# Patient Record
Sex: MALE | Race: WHITE | URBAN - METROPOLITAN AREA
[De-identification: names, ages, dates, MRNs, and addresses within clinical notes are randomized per-mention and may not be internally consistent; named-entity substitution may affect disease eponyms.]

---

## 2017-04-13 ENCOUNTER — OFFICE VISIT (OUTPATIENT)
Dept: SURGERY | Facility: CLINIC | Age: 37
End: 2017-04-13
Payer: COMMERCIAL

## 2017-04-13 VITALS
HEART RATE: 83 BPM | HEIGHT: 70 IN | DIASTOLIC BLOOD PRESSURE: 64 MMHG | SYSTOLIC BLOOD PRESSURE: 110 MMHG | WEIGHT: 170 LBS | BODY MASS INDEX: 24.34 KG/M2

## 2017-04-13 DIAGNOSIS — R10.31 GROIN PAIN, RIGHT: Primary | ICD-10-CM

## 2017-04-13 PROCEDURE — 99203 OFFICE O/P NEW LOW 30 MIN: CPT | Performed by: SURGERY

## 2017-04-13 NOTE — MR AVS SNAPSHOT
"              After Visit Summary   2017    Ranjan Breaux    MRN: [id number]           Patient Information     Date Of Birth          1980        Visit Information        Provider Department      2017 9:30 AM Freddie Grant MD [facility]      Today's Diagnoses     Unilateral recurrent inguinal hernia without obstruction or gangrene    -  1       Follow-ups after your visit        Who to contact     If you have questions or need follow up information about today's clinic visit or your schedule please contact [facility] directly at [phone].  Normal or non-critical lab and imaging results will be communicated to you by real trendshart, letter or phone within 4 business days after the clinic has received the results. If you do not hear from us within 7 days, please contact the clinic through real trendshart or phone. If you have a critical or abnormal lab result, we will notify you by phone as soon as possible.  Submit refill requests through [employer] or call your pharmacy and they will forward the refill request to us. Please allow 3 business days for your refill to be completed.          Additional Information About Your Visit        MyChart Information     [employer] lets you send messages to your doctor, view your test results, renew your prescriptions, schedule appointments and more. To sign up, go to [email or web address] . Click on \"Log in\" on the left side of the screen, which will take you to the Welcome page. Then click on \"Sign up Now\" on the right side of the page.     You will be asked to enter the access code listed below, as well as some personal information. Please follow the directions to create your username and password.     Your access code is: [email or web address]  Expires: 2017  9:32 AM     Your access code will  in 90 days. If you need help or a new code, please call your [place] clinic or [phone].   " "     Care EveryWhere ID     This is your Care EveryWhere ID. This could be used by other organizations to access your [facility] medical records  [id number]        Your Vitals Were     Pulse Height BMI (Body Mass Index)             83 5' 10\" (1.778 m) 24.39 kg/m2          Blood Pressure from Last 3 Encounters:   04/13/17 110/64   03/12/16 (!) 135/97    Weight from Last 3 Encounters:   04/13/17 170 lb (77.1 kg)              Today, you had the following     No orders found for display       Primary Care Provider    Physician No Ref-Primary       No address on file        Thank you!     Thank you for choosing [facility]  for your care. Our goal is always to provide you with excellent care. Hearing back from our patients is one way we can continue to improve our services. Please take a few minutes to complete the written survey that you may receive in the mail after your visit with us. Thank you!             Your Updated Medication List - Protect others around you: Learn how to safely use, store and throw away your medicines at www.disposemymeds.org.          This list is accurate as of: 4/13/17  9:32 AM.  Always use your most recent med list.                   Brand Name Dispense Instructions for use    HYDROcodone-acetaminophen 5-325 MG per tablet    NORCO    15 tablet    Take 1-2 tablets by mouth every 4 hours as needed for moderate to severe pain         "

## 2017-04-13 NOTE — PROGRESS NOTES
Ranjan Breaux Comes to see me today per recommendation of Dr. Milli DEXTER.  This 36-year-old very active healthy patient had undergone a  Lumbar disc surgery by Dr. Mishra in January 2017 with a good result.  He noted several weeks ago pain in his right groin area.  There was no specific event causing this that we does do rigorous yoga.  This is sore when he does use activities.  Since this may have been a hernia he was referred to see me.  He reports several years ago he had a similar episode that resolved with no intervention.  The present pain as an achpain with no sharp radiation.  He has not noticed a bulge.    PMH:  No allergies.             No chronic medications.             Surgical;  L5-S1 discectomy  January 2017             No underlying medical problems.    Smokes five cigarettes daily.  He knows he needs to quit and is trying to do so.  Counseling was given.    12 point review of system otherwise negative.  Good result with his back surgery.      Exam: Alert and appropriate. Normal affect.  Glasses.             Blood pressure 110/64.  Pulse 83.  BMI= 24.4 kg/m2  Weight= 77.1 kg             Chest= clear  Cardiovascular= regular rate              Abdomen= thin, soft, well-developed.              No umbilical hernia.              Bilateral descended testicles with no masses.               Tenderness with palpation of the right external ring more so than the                   left.  No bulging or evidence of hernia with Valsalva bilaterally.               Left side also has some slight tenderness but to lesser degree with                Palpation.      Impression:  No clinical evidence of inguinal hernia.  The exam is possible with the patient's thin body habitus.  He may have an inguinal strain associated with his yoga.  I would recommend conservative nonoperative treatment this time with rest as much as possible and anti-inflammatories to give this a chance to resolve on its own.  He is aware that sometimes  these injuries may go on to a true hernia that would require surgical treatment.  He'll return to see us in several weeks he notices no improvement.  I recommended that he see my associate Dr. Marco Levin if he does not improve.                         Cigarette smoking:  He knows that he should quit.       Freddie Grant MD       Please route or send letter to:  Dr Milli DEXTER

## 2017-04-13 NOTE — LETTER
2017    [facility]  [facility]  [birth year]    Ranjan ABRAMS Namita comes to see me today per recommendation of Dr. Milli DEXTER. This 36-year-old very active healthy patient had undergone a Lumbar disc surgery by Dr. Mishra in 2017 with a good result. He noted several weeks ago pain in his right groin area. There was no specific event causing this that we does do rigorous yoga. This is sore when he does use activities. Since this may have been a hernia he was referred to see me. He reports several years ago he had a similar episode that resolved with no intervention. The present pain as an achpain with no sharp radiation. He has not noticed a bulge.     PMH: No allergies.  No chronic medications.  Surgical; L5-S1 discectomy 2017  No underlying medical problems.     Smokes five cigarettes daily. He knows he needs to quit and is trying to do so. Counseling was given.     12 point review of system otherwise negative. Good result with his back surgery.      Exam: Alert and appropriate. Normal affect. Glasses.  Blood pressure 110/64. Pulse 83. BMI= 24.4 kg/m2 Weight= 77.1 kg  Chest= clear Cardiovascular= regular rate  Abdomen= thin, soft, well-developed.  No umbilical hernia.  Bilateral descended testicles with no masses.  Tenderness with palpation of the right external ring more so than the left. No bulging or evidence of hernia with Valsalva bilaterally.  Left side also has some slight tenderness but to lesser degree with Palpation.      Impression: No clinical evidence of inguinal hernia. The exam is possible with the patient's thin body habitus. He may have an inguinal strain associated with his yoga. I would recommend conservative nonoperative treatment this time with rest as much as possible and anti-inflammatories to give this a chance to resolve on its own. He is aware that sometimes these injuries may go on to a true hernia that would require surgical treatment. He'll return to see us in several  weeks he notices no improvement. I recommended that he see my associate Dr. Marco Levin if he does not improve.     Cigarette smoking: He knows that he should quit.     Freddie Grant MD

## 2017-06-08 ENCOUNTER — OFFICE VISIT (OUTPATIENT)
Dept: SURGERY | Facility: CLINIC | Age: 37
End: 2017-06-08
Payer: COMMERCIAL

## 2017-06-08 DIAGNOSIS — R10.31 GROIN PAIN, RIGHT: Primary | ICD-10-CM

## 2017-06-08 PROCEDURE — 99212 OFFICE O/P EST SF 10 MIN: CPT | Performed by: SURGERY

## 2017-06-08 NOTE — PROGRESS NOTES
Ranjan ABRAMS Namita Return to see me in the office today.  I saw him in April of this year for right groin pain.  At that time he had no definitive hernia.  He tried anti-inflammatories and decreasing his activities.  However, the pain is persistent.  This bothers him whenever he moves and sits and also standing at work.  He has no discomfort on the left side.      PMH:  Unchanged from 4/13/17 exam             Still smokes several cigarettes daily ( trying to quit)              No chronic medications.      Exam: Alert and appropriate.  Thin  Normal affect.              No obvious inguinal bulge on the right.              Nontender left inguinal area with no hernia              Very tender with external palpation the right angle area and also via               Scrotal exam to the external ring.   No definitive hernia bulge.    Impression:  Ongoing right inguinal pain with no definitive hernia sac bulge.                       As we had discussed earlier this may be a sportsman's hernia                       With a tear in the inguinal floor causing his discomfort.                        He has not resolved with conservative treatment.                        Dr. Marco Levin who is an expert in this field is in the office                          And he will examine Ranjan today.     Freddie Grant MD     See Dr George assessment.

## 2017-06-08 NOTE — MR AVS SNAPSHOT
After Visit Summary   6/8/2017    Ranjan Breaux    MRN: [id number]           Patient Information     Date Of Birth          1980        Visit Information        Provider Department      6/8/2017 9:15 AM Freddie Grant MD [facility]      Today's Diagnoses     Groin pain, right    -  1       Follow-ups after your visit        Your next 10 appointments already scheduled     Jun 09, 2017  7:00 AM CDT   MR PELVIS W/O & W CONTRAST with SHMRP1   [facility]    [address] [place] [id number]   [phone]           Take your medicines as usual, unless your doctor tells you not to. Bring a list of your current medicines to your exam (including vitamins, minerals and over-the-counter drugs).  You will be given intravenous contrast for this exam. To prepare:   The day before your exam, drink extra fluids at least six 8-ounce glasses (unless your doctor tells you to restrict your fluids).   Have a blood test (creatinine test) within 30 days of your exam. Go to your clinic or Diagnostic Imaging Department for this test.  The MRI machine uses a strong magnet. Please wear clothes without metal (snaps, zippers). A sweatsuit works well, or we may give you a hospital gown.  Please remove any body piercings and hair extensions before you arrive. You will also remove watches, jewelry, hairpins, wallets, dentures, partial dental plates and hearing aids. You may wear contact lenses, and you may be able to wear your rings. We have a safe place to keep your personal items, but it is safer to leave them at home.   **IMPORTANT** THE INSTRUCTIONS BELOW ARE ONLY FOR THOSE PATIENTS WHO HAVE BEEN TOLD THEY WILL RECEIVE SEDATION OR GENERAL ANESTHESIA DURING THEIR MRI PROCEDURE:  IF YOU WILL RECEIVE SEDATION (take medicine to help you relax during your exam):   You must get the medicine from your doctor  before you arrive. Bring the medicine to the exam. Do not take it at home.   Arrive one hour early. Bring someone who can take you home after the test. Your medicine will make you sleepy. After the exam, you may not drive, take a bus or take a taxi by yourself.   No eating 8 hours before your exam. You may have clear liquids up until 4 hours before your exam. (Clear liquids include water, clear tea, black coffee and fruit juice without pulp.)  IF YOU WILL RECEIVE ANESTHESIA (be asleep for your exam):   Arrive 1 1/2 hours early. Bring someone who can take you home after the test. You may not drive, take a bus or take a taxi by yourself.   No eating 8 hours before your exam. You may have clear liquids up until 4 hours before your exam. (Clear liquids include water, clear tea, black coffee and fruit juice without pulp.)  Please call the Imaging Department at your exam site with any questions.              Future tests that were ordered for you today     Open Future Orders        Priority Expected Expires Ordered    MR Pelvis w/o & w Contrast Routine 6/8/2017 6/8/2018 6/8/2017            Who to contact     If you have questions or need follow up information about today's clinic visit or your schedule please contact [facility] directly at [phone].  Normal or non-critical lab and imaging results will be communicated to you by Chaordixhart, letter or phone within 4 business days after the clinic has received the results. If you do not hear from us within 7 days, please contact the clinic through Chaordixhart or phone. If you have a critical or abnormal lab result, we will notify you by phone as soon as possible.  Submit refill requests through TalkTo or call your pharmacy and they will forward the refill request to us. Please allow 3 business days for your refill to be completed.          Additional Information About Your Visit        TalkTo Information     TalkTo lets you send messages to your doctor, view  "your test results, renew your prescriptions, schedule appointments and more. To sign up, go to [email or web address] . Click on \"Log in\" on the left side of the screen, which will take you to the Welcome page. Then click on \"Sign up Now\" on the right side of the page.     You will be asked to enter the access code listed below, as well as some personal information. Please follow the directions to create your username and password.     Your access code is: [email or web address]  Expires: 2017  9:32 AM     Your access code will  in 90 days. If you need help or a new code, please call your [place] clinic or [phone].        Care EveryWhere ID     This is your Care EveryWhere ID. This could be used by other organizations to access your [place] medical records  [id number]         Blood Pressure from Last 3 Encounters:   17 110/64   16 (!) 135/97    Weight from Last 3 Encounters:   17 170 lb (77.1 kg)               Primary Care Provider    Physician No Ref-Primary       No address on file        Thank you!     Thank you for choosing [facility]  for your care. Our goal is always to provide you with excellent care. Hearing back from our patients is one way we can continue to improve our services. Please take a few minutes to complete the written survey that you may receive in the mail after your visit with us. Thank you!             Your Updated Medication List - Protect others around you: Learn how to safely use, store and throw away your medicines at www.disposemymeds.org.          This list is accurate as of: 17  9:57 AM.  Always use your most recent med list.                   Brand Name Dispense Instructions for use    HYDROcodone-acetaminophen 5-325 MG per tablet    NORCO    15 tablet    Take 1-2 tablets by mouth every 4 hours as needed for moderate to severe pain         "

## 2017-06-08 NOTE — LETTER
2017    RE:  Ranjan MARSHALL Hed-:  [birth year]    Ranjan Breaux returns to see me in the office today.  I saw him in April of this year for right groin pain.  At that time he had no definitive hernia.  He tried anti-inflammatories and decreasing his activities. However, the pain is persistent.  This bothers him whenever he moves and sits and also standing at work.  He has no discomfort on the left side.     PMH:  Unchanged from 17 exam  Still smokes several cigarettes daily ( trying to quit)  No chronic medications.     Exam: Alert and appropriate.  Thin  Normal affect.  No obvious inguinal bulge on the right.  Nontender left inguinal area with no hernia  Very tender with external palpation the right angle area and also via  Scrotal exam to the external ring.   No definitive hernia bulge.     Impression:  Ongoing right inguinal pain with no definitive hernia sac bulge.  As we had discussed earlier this may be a sportsman's hernia  with a tear in the inguinal floor causing his discomfort.  He has not resolved with conservative treatment.  Dr. Marco Levin who is an expert in this field is in the office  and he will examine Ranjan today.     Freddie Grant MD

## 2017-06-09 ENCOUNTER — HOSPITAL ENCOUNTER (OUTPATIENT)
Dept: MRI IMAGING | Facility: CLINIC | Age: 37
Discharge: HOME | End: 2017-06-09
Attending: SURGERY | Admitting: SURGERY
Payer: COMMERCIAL

## 2017-06-09 DIAGNOSIS — R10.31 GROIN PAIN, RIGHT: ICD-10-CM

## 2017-06-09 PROCEDURE — A9585 GADOBUTROL INJECTION: HCPCS | Performed by: SURGERY

## 2017-06-09 PROCEDURE — 72197 MRI PELVIS W/O & W/DYE: CPT

## 2017-06-09 PROCEDURE — 25000128 H RX IP 250 OP 636: Performed by: SURGERY

## 2017-06-09 RX ORDER — GADOBUTROL 604.72 MG/ML
8 INJECTION INTRAVENOUS ONCE
Status: COMPLETED | OUTPATIENT
Start: 2017-06-09 | End: 2017-06-09

## 2017-06-09 RX ADMIN — GADOBUTROL 8 ML: 604.72 INJECTION INTRAVENOUS at 08:13

## 2017-07-17 ENCOUNTER — OFFICE VISIT (OUTPATIENT)
Dept: SURGERY | Facility: CLINIC | Age: 37
End: 2017-07-17
Payer: COMMERCIAL

## 2017-07-17 VITALS
DIASTOLIC BLOOD PRESSURE: 70 MMHG | HEIGHT: 70 IN | WEIGHT: 160 LBS | BODY MASS INDEX: 22.9 KG/M2 | HEART RATE: 81 BPM | SYSTOLIC BLOOD PRESSURE: 106 MMHG

## 2017-07-17 DIAGNOSIS — R10.31 RIGHT GROIN PAIN: Primary | ICD-10-CM

## 2017-07-17 PROCEDURE — 99213 OFFICE O/P EST LOW 20 MIN: CPT | Mod: 57 | Performed by: PHYSICIAN ASSISTANT

## 2017-07-17 PROCEDURE — 99213 OFFICE O/P EST LOW 20 MIN: CPT | Performed by: SURGERY

## 2017-07-17 RX ORDER — FLUTICASONE PROPIONATE 50 MCG
1 SPRAY, SUSPENSION (ML) NASAL DAILY
COMMUNITY

## 2017-07-17 NOTE — INTERVAL H&P NOTE
This note is for the purpose of making the H&P performed clinic within the last 30 days available in the hospital encounter.

## 2017-07-17 NOTE — MR AVS SNAPSHOT
"              After Visit Summary   7/17/2017    Ranjan Breaux    MRN: [id number]           Patient Information     Date Of Birth          1980        Visit Information        Provider Department      7/17/2017 12:15 PM Marco Levin MD [facility]       Follow-ups after your visit        Your next 10 appointments already scheduled     Jul 18, 2017  7:30 AM CDT   [facility] Same Day Surgery with Marco Levin MD, Nory Fuchs PA-C   [facility])    [facility])   [phone]            Jul 18, 2017   Procedure with Marco Levin MD   [facility] (--)    [address]  [facility] [address] [phone]              Who to contact     If you have questions or need follow up information about today's clinic visit or your schedule please contact [facility] directly at [phone].  Normal or non-critical lab and imaging results will be communicated to you by vSocialhart, letter or phone within 4 business days after the clinic has received the results. If you do not hear from us within 7 days, please contact the clinic through imeemt or phone. If you have a critical or abnormal lab result, we will notify you by phone as soon as possible.  Submit refill requests through Ikonisys or call your pharmacy and they will forward the refill request to us. Please allow 3 business days for your refill to be completed.          Additional Information About Your Visit        vSocialhart Information     Ikonis[employer] lets you send messages to your doctor, view your test results, renew your prescriptions, schedule appointments and more. To sign up, go to [email or web address] . Click on \"Log in\" on the left side of the screen, which will take you to the Welcome page. Then " "click on \"Sign up Now\" on the right side of the page.     You will be asked to enter the access code listed below, as well as some personal information. Please follow the directions to create your username and password.     Your access code is: [email or web address]  Expires: 10/15/2017 12:29 PM     Your access code will  in 90 days. If you need help or a new code, please call your [facility] clinic or [phone].        Care EveryWhere ID     This is your Care EveryWhere ID. This could be used by other organizations to access your [facility] medical records  [id number]        Your Vitals Were     Pulse Height BMI (Body Mass Index)             81 5' 10\" (1.778 m) 22.96 kg/m2          Blood Pressure from Last 3 Encounters:   17 106/70   17 110/64   16 (!) 135/97    Weight from Last 3 Encounters:   17 160 lb (72.6 kg)   17 170 lb (77.1 kg)              Today, you had the following     No orders found for display       Primary Care Provider    Physician No Ref-Primary       No address on file        Equal Access to Services     [facility]: Hadii kristie Oquendo, watrevada barrett, qaybta kaalmada adeangie, brian galvez . So [facility] [phone].    ATENCIÓN: Si habla español, tiene a gonzalez disposición servicios gratuitos de asistencia lingüística. Llame al [phone].    We comply with applicable federal civil rights laws and [place] laws. We do not discriminate on the basis of race, color, national origin, age, disability sex, sexual orientation or gender identity.            Thank you!     Thank you for choosing [facility]  for your care. Our goal is always to provide you with excellent care. Hearing back from our patients is one way we can continue to improve our services. Please take a few minutes to complete the written survey that you may receive in the mail after your visit with us. Thank you!             Your Updated Medication List - " Protect others around you: Learn how to safely use, store and throw away your medicines at www.disposemymeds.org.          This list is accurate as of: 7/17/17 12:31 PM.  Always use your most recent med list.                   Brand Name Dispense Instructions for use Diagnosis    fluticasone 50 MCG/ACT spray    FLONASE     Spray 1 spray into both nostrils daily        HYDROcodone-acetaminophen 5-325 MG per tablet    NORCO    15 tablet    Take 1-2 tablets by mouth every 4 hours as needed for moderate to severe pain

## 2017-07-17 NOTE — PROGRESS NOTES
Patient returns today to discuss his scheduled surgery for tomorrow.  He is a 29 seminal gentleman with chronic athletically induced right inguinal pain consistent with sportsman's hernia.  I saw him during a coordinated visit with Dr. Grant recently.  At the time of that visit I also felt that he was having some adductor pain and is referred for possible tendon lengthening surgery.  Since the time of this most recent visit he states his symptoms are in fact slightly worse.  He continues to have isolated discomfort within his inguinal canal with aggressive and strenuous activity.  Pain is present during coughing or sneezing or big laugh.  This continues to cause debilitation.  He was examined in the office and there continues to be no evidence for traditional hernia.    With a lengthy discussion regarding the management options.  He is scheduled to undergo groin expiration with athletic hernia repair and tenotomy of his adductor.  This procedure was thoroughly discussed.  We reviewed the risks, benefits and anticipated outcomes.  His questions were all answered.  We will proceed with surgery on the following day.  Total time spent was 15 minutes with greater than 50% face-to-face consultation.    Please route or send letter to:  Primary Care Provider (PCP)

## 2017-07-17 NOTE — MR AVS SNAPSHOT
"              After Visit Summary   7/17/2017    Ranjan Breaux    MRN: [id number]           Patient Information     Date Of Birth          1980        Visit Information        Provider Department      7/17/2017 12:30 PM Kleber Zavaleta PA-C [facility]      Today's Diagnoses     Right groin pain    -  1       Follow-ups after your visit        Your next 10 appointments already scheduled     Jul 18, 2017  7:30 AM CDT   [facility] Same Day Surgery with Marco Levin MD, Nory Fuchs PA-C   [facility] ([facility])    [facility] ([facility])   [phone]            Jul 18, 2017   Procedure with Marco Levin MD   [facility] (--)    [address]  [facility] [address] [phone]              Who to contact     If you have questions or need follow up information about today's clinic visit or your schedule please contact [facility] directly at [phone].  Normal or non-critical lab and imaging results will be communicated to you by [employer], letter or phone within 4 business days after the clinic has received the results. If you do not hear from us within 7 days, please contact the clinic through [employer] or phone. If you have a critical or abnormal lab result, we will notify you by phone as soon as possible.  Submit refill requests through [facility] or call your pharmacy and they will forward the refill request to us. Please allow 3 business days for your refill to be completed.          Additional Information About Your Visit        MyChart Information     [facility] lets you send messages to your doctor, view your test results, renew your prescriptions, schedule appointments and more. To sign up, go to [email or web address] . Click on \"Log in\" on the left side of the " "screen, which will take you to the Welcome page. Then click on \"Sign up Now\" on the right side of the page.     You will be asked to enter the access code listed below, as well as some personal information. Please follow the directions to create your username and password.     Your access code is: [email or web address]  Expires: 10/15/2017 12:29 PM     Your access code will  in 90 days. If you need help or a new code, please call your [facility] or [phone].        Care EveryWhere ID     This is your Care EveryWhere ID. This could be used by other organizations to access your [place] medical records  [id number]         Blood Pressure from Last 3 Encounters:   17 106/70   17 110/64   16 (!) 135/97    Weight from Last 3 Encounters:   17 72.6 kg (160 lb)   17 77.1 kg (170 lb)              Today, you had the following     No orders found for display       Primary Care Provider    Physician No Ref-Primary       No address on file        Equal Access to Services     [facility]: Hadii kristie Oquendo, waaxda barrett, qaybta jessealedinson hunter, brian galvez . So [facility] [phone].    ATENCIÓN: Si habla español, tiene a gonzalez disposición servicios gratuitos de asistencia lingüística. Llame al [phone].    We comply with applicable federal civil rights laws and [place] laws. We do not discriminate on the basis of race, color, national origin, age, disability sex, sexual orientation or gender identity.            Thank you!     Thank you for choosing [facility]  for your care. Our goal is always to provide you with excellent care. Hearing back from our patients is one way we can continue to improve our services. Please take a few minutes to complete the written survey that you may receive in the mail after your visit with us. Thank you!             Your Updated Medication List - Protect others around you: Learn how to safely use, " store and throw away your medicines at www.disposemymeds.org.          This list is accurate as of: 7/17/17  1:01 PM.  Always use your most recent med list.                   Brand Name Dispense Instructions for use Diagnosis    fluticasone 50 MCG/ACT spray    FLONASE     Spray 1 spray into both nostrils daily        HYDROcodone-acetaminophen 5-325 MG per tablet    NORCO    15 tablet    Take 1-2 tablets by mouth every 4 hours as needed for moderate to severe pain

## 2017-07-17 NOTE — H&P (VIEW-ONLY)
"[facility] Preoperative History & Physical     Ranjan Breaux MRN# [id number]   YOB: 1980 Age: 36 year old     Date of Surgery: 7/18/17  Surgeon: Marco Levin MD  Primary care provider: No Ref-Primary, Physician    PREOP DIAGNOSIS/REASON FOR SURGERY: Right Sports Hernia    SURGERY/PROCEDURE INDICATED: Right Groin Exploration with Hernia Repair     HISTORY OF PRESENT ILLNESS:   This patient is a 36 year old male who presents with Right groin tenderness for months.  On a previous occasion this improved with rest but returned.  Now pain is worse as the day goes on and with activity.  No bulge noted by patient.  Pain was present prior to his back surgery in January.    From chart check, I noted a 10 lb wt loss from April visit with Dr. Freddie Grant, to today's visit.  However, pt reports he fluctuates from 160-168 and is not concerned by this.    PAST MEDICAL HISTORY:   No past medical history on file.    PAST SURGICAL HISTORY:   Past Surgical History:   Procedure Laterality Date     BACK SURGERY  01/2017    Herniated disc   No anesthesia problems with that surgery.  Received Norco post op for pain    SOCIAL HISTORY:  Social History   Substance Use Topics     Smoking status: Current Every Day Smoker     Packs/day: 0.50     Smokeless tobacco: Not on file      Comment: 5 cigaretts daily-- trying to quit     Alcohol use Yes      Comment: rare        FAMILY HISTORY:   No family history on file.    Family history of anesthesia reaction: No  Family history of bleeding disorders: No    ALLERGIES:  No Known Allergies     MEDICATIONS:  Current Outpatient Prescriptions Ordered in Epic   Medication     fluticasone (FLONASE) 50 MCG/ACT spray daily         No current Epic-ordered facility-administered medications on file.        REVIEW OF SYSTEMS:  Brief ROS is negative other than noted in the HPI.  C: NEGATIVE for fever, chills, change in weight.  \"Cold\" or URI last week but feeling better now.  R: " NEGATIVE for significant cough or SOB.  Cough improved  CV: NEGATIVE for chest pain, palpitations or peripheral edema  GI: NEGATIVE for nausea, vomiting, abdominal pain, heartburn, or change in bowel habits  H: NEGATIVE for bleeding problems     PHYSICAL EXAM:  There were no vitals taken for this visit.  General - This is a well developed, well nourished male in no apparent distress.  HEENT - Normocephalic. Atraumatic. Moist mucous membranes. Pupils equal. EOMi. No scleral icterus.  Neck - Supple without masses.  Lungs - Clear to ascultation bilaterally without crackles or wheezing.    Heart - Regular rate & rhythm without murmur.  Abdomen - Soft, nontender, nondistended with +bowel sounds, no organomegaly.  Extremities - Moves all extremities. Warm without edema.  Neurologic - Nonfocal.    LAB/RADIOLOGY RESULTS:   EKG, CXR, Bloodwork deferred to Anesthesia if needed    IMPRESSION/ACTIVE PROBLEMS:    Right Sports Hernia/Groin Pain  Other pertinent diagnosis: N/A     PLAN:  Patient's active problems diagnostically and therapeutically optimized for planned procedure.     Moris Zavaleta PA-C  [phone]

## 2017-07-17 NOTE — PROGRESS NOTES
"[facility] Preoperative History & Physical     Ranjan Breaux MRN# [id number]   YOB: 1980 Age: 36 year old     Date of Surgery: 7/18/17  Surgeon: Marco Levin MD  Primary care provider: No Ref-Primary, Physician    PREOP DIAGNOSIS/REASON FOR SURGERY: Right Sports Hernia    SURGERY/PROCEDURE INDICATED: Right Groin Exploration with Hernia Repair     HISTORY OF PRESENT ILLNESS:   This patient is a 36 year old male who presents with Right groin tenderness for months.  On a previous occasion this improved with rest but returned.  Now pain is worse as the day goes on and with activity.  No bulge noted by patient.  Pain was present prior to his back surgery in January.    From chart check, I noted a 10 lb wt loss from April visit with Dr. Freddie Grant, to today's visit.  However, pt reports he fluctuates from 160-168 and is not concerned by this.    PAST MEDICAL HISTORY:   No past medical history on file.    PAST SURGICAL HISTORY:   Past Surgical History:   Procedure Laterality Date     BACK SURGERY  01/2017    Herniated disc   No anesthesia problems with that surgery.  Received Norco post op for pain    SOCIAL HISTORY:  Social History   Substance Use Topics     Smoking status: Current Every Day Smoker     Packs/day: 0.50     Smokeless tobacco: Not on file      Comment: 5 cigaretts daily-- trying to quit     Alcohol use Yes      Comment: rare        FAMILY HISTORY:   No family history on file.    Family history of anesthesia reaction: No  Family history of bleeding disorders: No    ALLERGIES:  No Known Allergies     MEDICATIONS:  Current Outpatient Prescriptions Ordered in Epic   Medication     fluticasone (FLONASE) 50 MCG/ACT spray daily         No current Epic-ordered facility-administered medications on file.        REVIEW OF SYSTEMS:  Brief ROS is negative other than noted in the HPI.  C: NEGATIVE for fever, chills, change in weight.  \"Cold\" or URI last week but feeling better now.  R: " NEGATIVE for significant cough or SOB.  Cough improved  CV: NEGATIVE for chest pain, palpitations or peripheral edema  GI: NEGATIVE for nausea, vomiting, abdominal pain, heartburn, or change in bowel habits  H: NEGATIVE for bleeding problems     PHYSICAL EXAM:  There were no vitals taken for this visit.  General - This is a well developed, well nourished male in no apparent distress.  HEENT - Normocephalic. Atraumatic. Moist mucous membranes. Pupils equal. EOMi. No scleral icterus.  Neck - Supple without masses.  Lungs - Clear to ascultation bilaterally without crackles or wheezing.    Heart - Regular rate & rhythm without murmur.  Abdomen - Soft, nontender, nondistended with +bowel sounds, no organomegaly.  Extremities - Moves all extremities. Warm without edema.  Neurologic - Nonfocal.    LAB/RADIOLOGY RESULTS:   EKG, CXR, Bloodwork deferred to Anesthesia if needed    IMPRESSION/ACTIVE PROBLEMS:    Right Sports Hernia/Groin Pain  Other pertinent diagnosis: N/A     PLAN:  Patient's active problems diagnostically and therapeutically optimized for planned procedure.     Moris Zavaleta PA-C  [phone]

## 2017-07-18 ENCOUNTER — HOSPITAL ENCOUNTER (OUTPATIENT)
Facility: CLINIC | Age: 37
Discharge: HOME | End: 2017-07-18
Attending: SURGERY | Admitting: ORTHOPAEDIC SURGERY
Payer: COMMERCIAL

## 2017-07-18 ENCOUNTER — ANESTHESIA (OUTPATIENT)
Dept: SURGERY | Facility: CLINIC | Age: 37
End: 2017-07-18
Payer: COMMERCIAL

## 2017-07-18 ENCOUNTER — ANESTHESIA EVENT (OUTPATIENT)
Dept: SURGERY | Facility: CLINIC | Age: 37
End: 2017-07-18
Payer: COMMERCIAL

## 2017-07-18 ENCOUNTER — APPOINTMENT (OUTPATIENT)
Dept: SURGERY | Facility: PHYSICIAN GROUP | Age: 37
End: 2017-07-18
Payer: COMMERCIAL

## 2017-07-18 VITALS
HEIGHT: 70 IN | TEMPERATURE: 97 F | OXYGEN SATURATION: 99 % | SYSTOLIC BLOOD PRESSURE: 110 MMHG | HEART RATE: 74 BPM | DIASTOLIC BLOOD PRESSURE: 69 MMHG | BODY MASS INDEX: 23.41 KG/M2 | RESPIRATION RATE: 16 BRPM | WEIGHT: 163.5 LBS

## 2017-07-18 DIAGNOSIS — Z98.890 S/P HERNIA REPAIR: ICD-10-CM

## 2017-07-18 DIAGNOSIS — Z87.19 S/P HERNIA REPAIR: ICD-10-CM

## 2017-07-18 DIAGNOSIS — S39.81XA SPORTS HERNIA, INITIAL ENCOUNTER: Primary | ICD-10-CM

## 2017-07-18 PROCEDURE — 25000125 ZZHC RX 250: Performed by: NURSE ANESTHETIST, CERTIFIED REGISTERED

## 2017-07-18 PROCEDURE — 37000008 ZZH ANESTHESIA TECHNICAL FEE, 1ST 30 MIN: Performed by: ORTHOPAEDIC SURGERY

## 2017-07-18 PROCEDURE — 25000128 H RX IP 250 OP 636: Performed by: NURSE ANESTHETIST, CERTIFIED REGISTERED

## 2017-07-18 PROCEDURE — 37000009 ZZH ANESTHESIA TECHNICAL FEE, EACH ADDTL 15 MIN: Performed by: ORTHOPAEDIC SURGERY

## 2017-07-18 PROCEDURE — 36000052 ZZH SURGERY LEVEL 2 EA 15 ADDTL MIN: Performed by: ORTHOPAEDIC SURGERY

## 2017-07-18 PROCEDURE — 27210794 ZZH OR GENERAL SUPPLY STERILE: Performed by: ORTHOPAEDIC SURGERY

## 2017-07-18 PROCEDURE — 49999 UNLISTED PX ABD PERTM&OMN: CPT | Performed by: SURGERY

## 2017-07-18 PROCEDURE — 71000027 ZZH RECOVERY PHASE 2 EACH 15 MINS: Performed by: ORTHOPAEDIC SURGERY

## 2017-07-18 PROCEDURE — 36000050 ZZH SURGERY LEVEL 2 1ST 30 MIN: Performed by: ORTHOPAEDIC SURGERY

## 2017-07-18 PROCEDURE — 40000170 ZZH STATISTIC PRE-PROCEDURE ASSESSMENT II: Performed by: ORTHOPAEDIC SURGERY

## 2017-07-18 PROCEDURE — 49999 UNLISTED PX ABD PERTM&OMN: CPT | Mod: AS | Performed by: PHYSICIAN ASSISTANT

## 2017-07-18 PROCEDURE — 25000132 ZZH RX MED GY IP 250 OP 250 PS 637: Performed by: PHYSICIAN ASSISTANT

## 2017-07-18 PROCEDURE — 25000128 H RX IP 250 OP 636: Performed by: ORTHOPAEDIC SURGERY

## 2017-07-18 PROCEDURE — 71000012 ZZH RECOVERY PHASE 1 LEVEL 1 FIRST HR: Performed by: ORTHOPAEDIC SURGERY

## 2017-07-18 PROCEDURE — 71000013 ZZH RECOVERY PHASE 1 LEVEL 1 EA ADDTL HR: Performed by: ORTHOPAEDIC SURGERY

## 2017-07-18 PROCEDURE — 27210995 ZZH RX 272: Performed by: ORTHOPAEDIC SURGERY

## 2017-07-18 PROCEDURE — C1781 MESH (IMPLANTABLE): HCPCS | Performed by: ORTHOPAEDIC SURGERY

## 2017-07-18 PROCEDURE — 25000125 ZZHC RX 250: Performed by: ORTHOPAEDIC SURGERY

## 2017-07-18 PROCEDURE — 25000128 H RX IP 250 OP 636: Performed by: ANESTHESIOLOGY

## 2017-07-18 PROCEDURE — 25000566 ZZH SEVOFLURANE, EA 15 MIN: Performed by: ORTHOPAEDIC SURGERY

## 2017-07-18 DEVICE — MESH PROGRIP 4.7X3" PARIETEX RIGHT TEM1208GR: Type: IMPLANTABLE DEVICE | Site: ABDOMEN | Status: FUNCTIONAL

## 2017-07-18 RX ORDER — MEPERIDINE HYDROCHLORIDE 25 MG/ML
12.5 INJECTION INTRAMUSCULAR; INTRAVENOUS; SUBCUTANEOUS
Status: DISCONTINUED | OUTPATIENT
Start: 2017-07-18 | End: 2017-07-18 | Stop reason: HOSPADM

## 2017-07-18 RX ORDER — SODIUM CHLORIDE, SODIUM LACTATE, POTASSIUM CHLORIDE, CALCIUM CHLORIDE 600; 310; 30; 20 MG/100ML; MG/100ML; MG/100ML; MG/100ML
INJECTION, SOLUTION INTRAVENOUS CONTINUOUS
Status: DISCONTINUED | OUTPATIENT
Start: 2017-07-18 | End: 2017-07-18 | Stop reason: HOSPADM

## 2017-07-18 RX ORDER — FENTANYL CITRATE 50 UG/ML
INJECTION, SOLUTION INTRAMUSCULAR; INTRAVENOUS PRN
Status: DISCONTINUED | OUTPATIENT
Start: 2017-07-18 | End: 2017-07-18

## 2017-07-18 RX ORDER — CEFAZOLIN SODIUM 1 G/3ML
1 INJECTION, POWDER, FOR SOLUTION INTRAMUSCULAR; INTRAVENOUS SEE ADMIN INSTRUCTIONS
Status: DISCONTINUED | OUTPATIENT
Start: 2017-07-18 | End: 2017-07-18 | Stop reason: HOSPADM

## 2017-07-18 RX ORDER — NALOXONE HYDROCHLORIDE 0.4 MG/ML
.1-.4 INJECTION, SOLUTION INTRAMUSCULAR; INTRAVENOUS; SUBCUTANEOUS
Status: DISCONTINUED | OUTPATIENT
Start: 2017-07-18 | End: 2017-07-18 | Stop reason: HOSPADM

## 2017-07-18 RX ORDER — ONDANSETRON 2 MG/ML
INJECTION INTRAMUSCULAR; INTRAVENOUS PRN
Status: DISCONTINUED | OUTPATIENT
Start: 2017-07-18 | End: 2017-07-18

## 2017-07-18 RX ORDER — ONDANSETRON 2 MG/ML
4 INJECTION INTRAMUSCULAR; INTRAVENOUS EVERY 30 MIN PRN
Status: DISCONTINUED | OUTPATIENT
Start: 2017-07-18 | End: 2017-07-18 | Stop reason: HOSPADM

## 2017-07-18 RX ORDER — FENTANYL CITRATE 0.05 MG/ML
25-50 INJECTION, SOLUTION INTRAMUSCULAR; INTRAVENOUS
Status: DISCONTINUED | OUTPATIENT
Start: 2017-07-18 | End: 2017-07-18 | Stop reason: HOSPADM

## 2017-07-18 RX ORDER — GLYCOPYRROLATE 0.2 MG/ML
INJECTION, SOLUTION INTRAMUSCULAR; INTRAVENOUS PRN
Status: DISCONTINUED | OUTPATIENT
Start: 2017-07-18 | End: 2017-07-18

## 2017-07-18 RX ORDER — LIDOCAINE HYDROCHLORIDE 20 MG/ML
INJECTION, SOLUTION INFILTRATION; PERINEURAL PRN
Status: DISCONTINUED | OUTPATIENT
Start: 2017-07-18 | End: 2017-07-18

## 2017-07-18 RX ORDER — HYDROCODONE BITARTRATE AND ACETAMINOPHEN 5; 325 MG/1; MG/1
1-2 TABLET ORAL
Status: COMPLETED | OUTPATIENT
Start: 2017-07-18 | End: 2017-07-18

## 2017-07-18 RX ORDER — PROPOFOL 10 MG/ML
INJECTION, EMULSION INTRAVENOUS PRN
Status: DISCONTINUED | OUTPATIENT
Start: 2017-07-18 | End: 2017-07-18

## 2017-07-18 RX ORDER — SODIUM CHLORIDE, SODIUM LACTATE, POTASSIUM CHLORIDE, CALCIUM CHLORIDE 600; 310; 30; 20 MG/100ML; MG/100ML; MG/100ML; MG/100ML
INJECTION, SOLUTION INTRAVENOUS CONTINUOUS PRN
Status: DISCONTINUED | OUTPATIENT
Start: 2017-07-18 | End: 2017-07-18

## 2017-07-18 RX ORDER — DEXAMETHASONE SODIUM PHOSPHATE 4 MG/ML
INJECTION, SOLUTION INTRA-ARTICULAR; INTRALESIONAL; INTRAMUSCULAR; INTRAVENOUS; SOFT TISSUE PRN
Status: DISCONTINUED | OUTPATIENT
Start: 2017-07-18 | End: 2017-07-18

## 2017-07-18 RX ORDER — NEOSTIGMINE METHYLSULFATE 1 MG/ML
VIAL (ML) INJECTION PRN
Status: DISCONTINUED | OUTPATIENT
Start: 2017-07-18 | End: 2017-07-18

## 2017-07-18 RX ORDER — IBUPROFEN 800 MG/1
800 TABLET, FILM COATED ORAL EVERY 8 HOURS PRN
Qty: 90 TABLET | Refills: 0 | Status: SHIPPED | OUTPATIENT
Start: 2017-07-18 | End: 2017-07-24

## 2017-07-18 RX ORDER — ONDANSETRON 4 MG/1
4 TABLET, ORALLY DISINTEGRATING ORAL EVERY 30 MIN PRN
Status: DISCONTINUED | OUTPATIENT
Start: 2017-07-18 | End: 2017-07-18 | Stop reason: HOSPADM

## 2017-07-18 RX ORDER — HYDROCODONE BITARTRATE AND ACETAMINOPHEN 5; 325 MG/1; MG/1
1-2 TABLET ORAL EVERY 4 HOURS PRN
Qty: 20 TABLET | Refills: 0 | Status: SHIPPED | OUTPATIENT
Start: 2017-07-18 | End: 2017-07-24

## 2017-07-18 RX ORDER — CEFAZOLIN SODIUM 2 G/100ML
2 INJECTION, SOLUTION INTRAVENOUS
Status: COMPLETED | OUTPATIENT
Start: 2017-07-18 | End: 2017-07-18

## 2017-07-18 RX ORDER — MAGNESIUM HYDROXIDE 1200 MG/15ML
LIQUID ORAL PRN
Status: DISCONTINUED | OUTPATIENT
Start: 2017-07-18 | End: 2017-07-18 | Stop reason: HOSPADM

## 2017-07-18 RX ORDER — BUPIVACAINE HYDROCHLORIDE AND EPINEPHRINE 5; 5 MG/ML; UG/ML
INJECTION, SOLUTION PERINEURAL PRN
Status: DISCONTINUED | OUTPATIENT
Start: 2017-07-18 | End: 2017-07-18 | Stop reason: HOSPADM

## 2017-07-18 RX ADMIN — HYDROCODONE BITARTRATE AND ACETAMINOPHEN 1 TABLET: 5; 325 TABLET ORAL at 09:44

## 2017-07-18 RX ADMIN — SODIUM CHLORIDE, POTASSIUM CHLORIDE, SODIUM LACTATE AND CALCIUM CHLORIDE: 600; 310; 30; 20 INJECTION, SOLUTION INTRAVENOUS at 07:24

## 2017-07-18 RX ADMIN — ONDANSETRON 4 MG: 2 INJECTION INTRAMUSCULAR; INTRAVENOUS at 08:28

## 2017-07-18 RX ADMIN — FENTANYL CITRATE 50 MCG: 50 INJECTION, SOLUTION INTRAMUSCULAR; INTRAVENOUS at 09:15

## 2017-07-18 RX ADMIN — DEXAMETHASONE SODIUM PHOSPHATE 4 MG: 4 INJECTION, SOLUTION INTRA-ARTICULAR; INTRALESIONAL; INTRAMUSCULAR; INTRAVENOUS; SOFT TISSUE at 07:37

## 2017-07-18 RX ADMIN — FENTANYL CITRATE 50 MCG: 50 INJECTION, SOLUTION INTRAMUSCULAR; INTRAVENOUS at 07:44

## 2017-07-18 RX ADMIN — NEOSTIGMINE METHYLSULFATE 4 MG: 1 INJECTION INTRAMUSCULAR; INTRAVENOUS; SUBCUTANEOUS at 08:29

## 2017-07-18 RX ADMIN — MIDAZOLAM HYDROCHLORIDE 2 MG: 1 INJECTION, SOLUTION INTRAMUSCULAR; INTRAVENOUS at 07:24

## 2017-07-18 RX ADMIN — ROCURONIUM BROMIDE 50 MG: 10 INJECTION INTRAVENOUS at 07:28

## 2017-07-18 RX ADMIN — FENTANYL CITRATE 150 MCG: 50 INJECTION, SOLUTION INTRAMUSCULAR; INTRAVENOUS at 07:28

## 2017-07-18 RX ADMIN — LIDOCAINE HYDROCHLORIDE 100 MG: 20 INJECTION, SOLUTION INFILTRATION; PERINEURAL at 07:28

## 2017-07-18 RX ADMIN — GLYCOPYRROLATE 0.6 MG: 0.2 INJECTION, SOLUTION INTRAMUSCULAR; INTRAVENOUS at 08:29

## 2017-07-18 RX ADMIN — FENTANYL CITRATE 50 MCG: 50 INJECTION, SOLUTION INTRAMUSCULAR; INTRAVENOUS at 10:02

## 2017-07-18 RX ADMIN — CEFAZOLIN SODIUM 2 G: 2 INJECTION, SOLUTION INTRAVENOUS at 07:32

## 2017-07-18 RX ADMIN — SODIUM CHLORIDE, POTASSIUM CHLORIDE, SODIUM LACTATE AND CALCIUM CHLORIDE: 600; 310; 30; 20 INJECTION, SOLUTION INTRAVENOUS at 09:20

## 2017-07-18 RX ADMIN — PROPOFOL 150 MG: 10 INJECTION, EMULSION INTRAVENOUS at 07:28

## 2017-07-18 ASSESSMENT — LIFESTYLE VARIABLES: TOBACCO_USE: 1

## 2017-07-18 ASSESSMENT — ENCOUNTER SYMPTOMS: SEIZURES: 0

## 2017-07-18 NOTE — ANESTHESIA PREPROCEDURE EVALUATION
Anesthesia Evaluation     . Pt has had prior anesthetic.     No history of anesthetic complications          ROS/MED HX    ENT/Pulmonary:     (+)tobacco use, Current use , . .   (-) sleep apnea   Neurologic:      (-) seizures and CVA   Cardiovascular:        (-) hypertension   METS/Exercise Tolerance:     Hematologic:         Musculoskeletal:         GI/Hepatic:        (-) GERD and liver disease   Renal/Genitourinary:      (-) renal disease   Endo:      (-) Type II DM and thyroid disease   Psychiatric:         Infectious Disease:         Malignancy:         Other:                     Physical Exam  Normal systems: cardiovascular, pulmonary and dental    Airway   Mallampati: II  TM distance: >3 FB  Neck ROM: full    Dental     Cardiovascular       Pulmonary                     Anesthesia Plan      History & Physical Review  History and physical reviewed and following examination; no interval change.    ASA Status:  2 .    NPO Status:  > 8 hours    Plan for General and ETT with Propofol induction. Maintenance will be Balanced.    PONV prophylaxis:  Ondansetron (or other 5HT-3) and Dexamethasone or Solumedrol       Postoperative Care  Postoperative pain management:  IV analgesics.      Consents  Anesthetic plan, risks, benefits and alternatives discussed with:  Patient..                          .

## 2017-07-18 NOTE — IP AVS SNAPSHOT
[place] [address]    [place] [address]    Phone:  [phone]                                       After Visit Summary   7/18/2017    Ranjan Breaux    MRN: [id number]           After Visit Summary Signature Page     I have received my discharge instructions, and my questions have been answered. I have discussed any challenges I see with this plan with the nurse or doctor.    ..........................................................................................................................................  Patient/Patient Representative Signature      ..........................................................................................................................................  Patient Representative Print Name and Relationship to Patient    ..................................................               ................................................  Date                                            Time    ..........................................................................................................................................  Reviewed by Signature/Title    ...................................................              ..............................................  Date                                                            Time

## 2017-07-18 NOTE — BRIEF OP NOTE
[facility] Brief Operative Note    Pre-operative diagnosis: RIGHT GROIN AND HIP PAIN    Post-operative diagnosis Right Sports Hernia   Procedure: Procedure:   RIGHT GROIN EXPLORATION WITH SPORTS HERNIA REPAIR WITH MESH  - Wound Class: I-Clean     Surgeon(s): Surgeon(s) and Role:     * Marco Levin MD - Primary     * Nory Fuchs PA-C     Estimated blood loss: 7ml total    Specimens: * No specimens in log *   Findings: 12x8cm progrip mesh placed right groin. No immediate complications. See operative report for full details.       Nory Fuchs PA-C  [facility]  [phone]  e

## 2017-07-18 NOTE — OP NOTE
PREOPERATIVE DIAGNOSIS: Chronic athletically induced Right  Groin pain.   POSTOPERATIVE DIAGNOSIS: Chronic athletically induced  Right Groin pain.   PROCEDURE: Right Groin exploration with sportsman hernia repair with mesh.   SURGEON: Marco Levin MD   ASSISTANT: Nory Fuchs PA-C, Physician's assistant first assistant was necessary during this procedure due to expertise in patient positioning, prepping, incisional opening, retraction, exposure, and suctioning.  ANESTHESIA: General   ESTIMATED BLOOD LOSS: 2 mL.   DRAINS: None.   COMPLICATIONS: None.   SPECIMENS: None.   OPERATIVE INDICATIONS: Mr. Breaux Presented to my office with chronic athletically induced  Right  Groin pain. Signs and symptoms seemed to be consistent with a sportsman-type hernia. Options were discussed and it was elected to proceed with open repair. Potential risks of bleeding, infection, neurovascular injury to the vas deferens or testicle, recurrent hernia, chronic pain were all reviewed in detail and he wished to proceed.   DESCRIPTION OF PROCEDURE: After informed consent was obtained, the patient was taken to the operating room and placed supine on the operating table. Following the induction of adequate general anesthetic, the patient's Right  groin was shaved, prepped and draped in the usual fashion. A timeout was then completed and IV antibiotics were administered. A standard groin incision was then made and dissection was carried down to the external oblique fascia. This was sharply incised and the inguinal canal was exposed. The spermatic cord and its contents were mobilized and encircled with a Penrose drain. Fascial separations and tears were present within the inguinal canal specifically within the internal oblique and transverse abdominis consistent with sportsman-type hernia.  The inguinal floor was reinforced with interrupted 2-0 Vicryl suture in a Bassini type fashion securing the transverse abdominis to the shelving portion  of the inguinal ligament. Anatomic side-specific Progrip Mesh was then introduced.  The flap to the mesh had been folded back.  The mesh was then placed in the inguinal canal.  The flap was then brought around the spermatic cord and secured into position.  The lateral tail of the mesh was placed up and underneath the fascia of the external oblique.  The spermatic cord was then released and the fascia of the external oblique was closed using running 0 Vicryl stitch. The operative area was infiltrated with  local anesthetic. The deep subcutaneous was closed with 3-0 Vicryl stitch. Skin incision was closed with subcuticular 4-0 Monocryl stitch. Benzoin and Steri-Strips were applied. Needle and sponge counts were correct. The patient tolerated this well. He was awakened in the operating room and taken to recovery room in stable condition.   SEB DANIELS MD

## 2017-07-18 NOTE — IP AVS SNAPSHOT
MRN:[id number]                      After Visit Summary   7/18/2017    Ranjan Breaux    MRN: [id number]           Thank you!     Thank you for choosing [facility] for your care. Our goal is always to provide you with excellent care. Hearing back from our patients is one way we can continue to improve our services. Please take a few minutes to complete the written survey that you may receive in the mail after you visit with us. Thank you!        Patient Information     Date Of Birth          1980        About your hospital stay     You were admitted on:  July 18, 2017 You last received care in the:  [facility] PACU    You were discharged on:  July 18, 2017       Who to Call     For medical emergencies, please call [phone].  For non-urgent questions about your medical care, please call your primary care provider or clinic, None  For questions related to your surgery, please call your surgery clinic        Attending Provider     Provider Marco Paulino MD General Surgery       Primary Care Provider    Physician No Ref-Primary      After Care Instructions     Diet Instructions       Resume pre-procedure diet            Discharge Instructions       Please follow-up in 2 weeks in Dr. Levin's for your first postoperative appointment with him. Call [phone] to schedule.  Our clinic's name [facility]. The address is [address] [place] [address] [facility], [address], [place], [place], [address]            No Alcohol       For 24 hours post procedure            No driving or operating machinery        until the day after procedure            Weight bearing status - Partial       SPORTS HERNIA ACTIVITY ADVANCEMENT  Begin day 1 isometric groin stretches (e.g. Butterfly stretch)    For the next 2 weeks low impact straight line activity (ie: bike, elliptical, swim/pool therapy (after 10-14 days))    Begin week 3 - jog, weight lifting at lower weights higher reps    Begin week 4 - run, cutting,  weight lifting - unlimited.  Sports activities ok.    Return to full activity between 4-6 weeks                  Further instructions from your care team       1.  Weight bearing status: WBAT  2.  Remove dressing in 48 hours. Then dry dressing changes 5-6 times daily  3.  May shower with the wound covered after dressing removed.  4.  PT to start within a week from surgery.  5.  Pain medications as directed.  6.  Ice 2-3 x per day for the first 2-6 weeks.  7.  Brace: N/A  8.  Follow up with Dr. Mao in 10-14 days for wound check.  Please call for an appt. If one has not been previously scheduled.  [phone].  9.  Please contact Holley Castellon with any questions or concerns:[phone].     Same Day Surgery Discharge Instructions for  Sedation and General Anesthesia       It's not unusual to feel dizzy, light-headed or faint for up to 24 hours after surgery or while taking pain medication.  If you have these symptoms: sit for a few minutes before standing and have someone assist you when you get up to walk or use the bathroom.      You should rest and relax for the next 24 hours. We recommend you make arrangements to have an adult stay with you for at least 24 hours after your discharge.  Avoid hazardous and strenuous activity.      DO NOT DRIVE any vehicle or operate mechanical equipment for 24 hours following the end of your surgery.  Even though you may feel normal, your reactions may be affected by the medication you have received.      Do not drink alcoholic beverages for 24 hours following surgery.       Slowly progress to your regular diet as you feel able. It's not unusual to feel nauseated and/or vomit after receiving anesthesia.  If you develop these symptoms, drink clear liquids (apple juice, ginger ale, broth, 7-up, etc. ) until you feel better.  If your nausea and vomiting persists for 24 hours, please notify your surgeon.        All narcotic pain medications, along with inactivity and anesthesia,  can cause constipation. Drinking plenty of liquids and increasing fiber intake will help.      For any questions of a medical nature, call your surgeon.      Do not make important decisions for 24 hours.      If you had general anesthesia, you may have a sore throat for a couple of days related to the breathing tube used during surgery.  You may use Cepacol lozenges to help with this discomfort.  If it worsens or if you develop a fever, contact your surgeon.       If you feel your pain is not well managed with the pain medications prescribed by your surgeon, please contact your surgeon's office to let them know so they can address your concerns.       **If you have questions or concerns about your procedure,  call Dr. Mao at [phone]**      [facility]   Discharge Instructions: Post-Operative Hernia  [facility]    DIET    No restrictions.      Suggest plenty of liquids and high fiber foods to keep stools soft.      May take 1 oz. (2 tablespoons) Milk of Magnesia the evening following surgery to encourage bowel movement.    BANDAGE    Take the bandage off 48 hours after surgery.      If you have tape strips leave them on.  If they become loose, take them off.      Apply ice to groin periodically during the first 48 hours after surgery.    SHOWER    You may shower after the bandage is off.  Pat the incision dry.    ACTIVITY    You may do what is comfortable.    It is not wise to lift weights, or do anything that requires maximal physical effort for several weeks.      Individual restrictions should be discussed with your surgeon.    You may drive when you are comfortable enough to drive safely.  (Usually 3-4 days.)    WORK      You may return to non-physical work whenever you are comfortable.  (If you can drive, you probably can work.)  Physical work will be decided individually.    PAIN    You may have post-operative pain for several days.    Use the pain medication as directed at  "your discretion.    Expect gradual resolution of pain over several days.    If your hernia was repaired by laparoscopy, you may develop shoulder pain.  This shoulder pain may be present immediately or may not occur for 24 hours. Some shoulder pain may persist though it should begin declining around the 48 hour jose. Tips for coping include: applying a heating pad to the affected shoulder, lying flat or on your side, use of post-operative analgesia, and ambulating.  Contact your surgeon if the pain becomes intolerable or persistent beyond a few days.    EXPECTATIONS    You can expect: some swelling; black and blue areas possibly involving the testicles and penis; some numbness.  These are not dangerous.    RETURN APPOINTMENT    Please make your post-operative appointment for 10-15 days after surgery.      We recommend making this appointment soon after your surgery date has been confirmed.    CALL OUR CLINICAL OFFICE AT [phone] IF YOU HAVE:    Fever or chills    Drainage from the incision(s)    Significant bleeding    Increasing pain after the first 36 hours    Any other concerns                               Pending Results     No orders found from 7/16/2017 to 7/19/2017.            Admission Information     Date & Time Provider Department Dept. Phone    7/18/2017 Marco Levin MD [facility] PACU [phone]      Your Vitals Were     Blood Pressure Pulse Temperature Respirations Height Weight    100/59 74 97  F (36.1  C) (Temporal) 16 1.778 m (5' 10\") 74.2 kg (163 lb 8 oz)    Pulse Oximetry BMI (Body Mass Index)                97% 23.46 kg/m2          MyChart Information     [employer] lets you send messages to your doctor, view your test results, renew your prescriptions, schedule appointments and more. To sign up, go to [email or web address] . Click on \"Log in\" on the left side of the screen, which will take you to the Welcome page. Then click on \"Sign up Now\" on the right side of the " page.     You will be asked to enter the access code listed below, as well as some personal information. Please follow the directions to create your username and password.     Your access code is: [email or web address]  Expires: 10/15/2017 12:29 PM     Your access code will  in 90 days. If you need help or a new code, please call your [place] clinic or [phone].        Care EveryWhere ID     This is your Care EveryWhere ID. This could be used by other organizations to access your [place] medical records  [id number]        Equal Access to Services     [facility]: Hadii kristie no Socornelio, watrevada luyaniraadaha, qakendra kaalmadamon hunter, brian galvez . So [facility] [phone].    ATENCIÓN: Si habla español, tiene a gonzalez disposición servicios gratuitos de asistencia lingüística. Llame al [phone].    We comply with applicable federal civil rights laws and [place] laws. We do not discriminate on the basis of race, color, national origin, age, disability sex, sexual orientation or gender identity.               Review of your medicines      START taking        Dose / Directions    HYDROcodone-acetaminophen 5-325 MG per tablet   Commonly known as:  NORCO   Used for:  Sports hernia, initial encounter   Notes to Patient:    1 tab at 9:44 AM        Dose:  1-2 tablet   Take 1-2 tablets by mouth every 4 hours as needed for moderate to severe pain   Quantity:  20 tablet   Refills:  0       ibuprofen 800 MG tablet   Commonly known as:  ADVIL/MOTRIN   Used for:  Sports hernia, initial encounter        Dose:  800 mg   Take 1 tablet (800 mg) by mouth every 8 hours as needed for moderate pain   Quantity:  90 tablet   Refills:  0       magnesium hydroxide 400 MG/5ML suspension   Commonly known as:  MILK OF MAGNESIA   Used for:  S/P hernia repair        Dose:  30 mL   Take 30 mLs by mouth daily as needed for constipation   Quantity:  118 mL   Refills:  1         CONTINUE these medicines which have  NOT CHANGED        Dose / Directions    fluticasone 50 MCG/ACT spray   Commonly known as:  FLONASE        Dose:  1 spray   Spray 1 spray into both nostrils daily   Refills:  0            Where to get your medicines      These medications were sent to [facility] [place], [place] - [address]  [address] [place] [address]     Phone:  [phone]     ibuprofen 800 MG tablet    magnesium hydroxide 400 MG/5ML suspension         Some of these will need a paper prescription and others can be bought over the counter. Ask your nurse if you have questions.     Bring a paper prescription for each of these medications     HYDROcodone-acetaminophen 5-325 MG per tablet                Protect others around you: Learn how to safely use, store and throw away your medicines at www.disposemymeds.org.             Medication List: This is a list of all your medications and when to take them. Check marks below indicate your daily home schedule. Keep this list as a reference.      Medications           Morning Afternoon Evening Bedtime As Needed    fluticasone 50 MCG/ACT spray   Commonly known as:  FLONASE   Spray 1 spray into both nostrils daily                                HYDROcodone-acetaminophen 5-325 MG per tablet   Commonly known as:  NORCO   Take 1-2 tablets by mouth every 4 hours as needed for moderate to severe pain   Last time this was given:  1 tablet on 7/18/2017  9:44 AM   Notes to Patient:    1 tab at 9:44 AM                                ibuprofen 800 MG tablet   Commonly known as:  ADVIL/MOTRIN   Take 1 tablet (800 mg) by mouth every 8 hours as needed for moderate pain                                magnesium hydroxide 400 MG/5ML suspension   Commonly known as:  MILK OF MAGNESIA   Take 30 mLs by mouth daily as needed for constipation

## 2017-07-18 NOTE — BRIEF OP NOTE
[facility] Brief Operative Note    Pre-operative diagnosis: RIGHT GROIN AND HIP PAIN    Post-operative diagnosis same   Procedure: Procedure(s):   RIGHT HIP ADDUCTOR LENGTHENING (DR MAO)RIGHT GROIN EXPLORATION WITH SPORTS HERNIA REPAIR WITH MESH(JEREMIAH) - Wound Class: I-Clean   - Wound Class: I-Clean   Surgeon(s): Surgeon(s) and Role:  Panel 1:     * Newton Mao MD - Primary     * Prabha Lin PA-C    Panel 2:     * Marco Levin MD - Primary   Estimated blood loss: 5 mL    Specimens: * No specimens in log *   Findings: See op report

## 2017-07-18 NOTE — ANESTHESIA POSTPROCEDURE EVALUATION
Patient: Ranjan ABRAMS [facility]    Procedure(s):   RIGHT HIP ADDUCTOR LENGTHENING (DR SILVA)RIGHT GROIN EXPLORATION WITH SPORTS HERNIA REPAIR WITH MESH(DANIELS) - Wound Class: I-Clean   - Wound Class: I-Clean    Diagnosis:RIGHT GROIN AND HIP PAIN   Diagnosis Additional Information: No value filed.    Anesthesia Type:  General, ETT    Note:  Anesthesia Post Evaluation    Patient location during evaluation: PACU  Patient participation: Able to fully participate in evaluation  Level of consciousness: awake and alert  Pain management: satisfactory to patient  Airway patency: patent  Cardiovascular status: hemodynamically stable  Respiratory status: acceptable and unassisted  Hydration status: balanced  PONV: none     Anesthetic complications: None          Last vitals:  Vitals:    07/18/17 0850 07/18/17 0900 07/18/17 0915   BP: 114/71 108/70    Pulse: 74     Resp: 16 14 14   Temp: 36.1  C (97  F)     SpO2:  97% 100%         Electronically Signed By: Josep Mcgregor MD  July 18, 2017  9:38 AM

## 2017-07-18 NOTE — ANESTHESIA CARE TRANSFER NOTE
Patient: Ranjan ABRAMS [facility]    Procedure(s):   RIGHT HIP ADDUCTOR LENGTHENING (DR SILVA)RIGHT GROIN EXPLORATION WITH SPORTS HERNIA REPAIR WITH MESH(DANIELS) - Wound Class: I-Clean   - Wound Class: I-Clean    Diagnosis: RIGHT GROIN AND HIP PAIN   Diagnosis Additional Information: No value filed.    Anesthesia Type:   General, ETT     Note:  Airway :Face Mask  Patient transferred to:PACU  Comments: Pt exhibits spontaneous respirations, follows commands, suctioned, extubated, dentition unchanged, exchanging well, transferred to pacu with 10L O2 via mask, all monitors and alarms on, report to RN, VSS      Vitals: (Last set prior to Anesthesia Care Transfer)    CRNA VITALS  7/18/2017 0815 - 7/18/2017 0851      7/18/2017             Pulse: 81    SpO2: 100 %    Resp Rate (set): 10                Electronically Signed By: BETO Enriquez CRNA  July 18, 2017  8:51 AM

## 2017-07-18 NOTE — DISCHARGE INSTRUCTIONS
1.  Weight bearing status: WBAT  2.  Remove dressing in 48 hours. Then dry dressing changes 5-6 times daily  3.  May shower with the wound covered after dressing removed.  4.  PT to start within a week from surgery.  5.  Pain medications as directed.  6.  Ice 2-3 x per day for the first 2-6 weeks.  7.  Brace: N/A  8.  Follow up with Dr. Mao in 10-14 days for wound check.  Please call for an appt. If one has not been previously scheduled.  [phone].  9.  Please contact Holley Castellon with any questions or concerns:[phone].     Same Day Surgery Discharge Instructions for  Sedation and General Anesthesia       It's not unusual to feel dizzy, light-headed or faint for up to 24 hours after surgery or while taking pain medication.  If you have these symptoms: sit for a few minutes before standing and have someone assist you when you get up to walk or use the bathroom.      You should rest and relax for the next 24 hours. We recommend you make arrangements to have an adult stay with you for at least 24 hours after your discharge.  Avoid hazardous and strenuous activity.      DO NOT DRIVE any vehicle or operate mechanical equipment for 24 hours following the end of your surgery.  Even though you may feel normal, your reactions may be affected by the medication you have received.      Do not drink alcoholic beverages for 24 hours following surgery.       Slowly progress to your regular diet as you feel able. It's not unusual to feel nauseated and/or vomit after receiving anesthesia.  If you develop these symptoms, drink clear liquids (apple juice, ginger ale, broth, 7-up, etc. ) until you feel better.  If your nausea and vomiting persists for 24 hours, please notify your surgeon.        All narcotic pain medications, along with inactivity and anesthesia, can cause constipation. Drinking plenty of liquids and increasing fiber intake will help.      For any questions of a medical nature, call your surgeon.      Do  not make important decisions for 24 hours.      If you had general anesthesia, you may have a sore throat for a couple of days related to the breathing tube used during surgery.  You may use Cepacol lozenges to help with this discomfort.  If it worsens or if you develop a fever, contact your surgeon.       If you feel your pain is not well managed with the pain medications prescribed by your surgeon, please contact your surgeon's office to let them know so they can address your concerns.       **If you have questions or concerns about your procedure,  call Dr. Mao at [phone]**      [facility]   Discharge Instructions: Post-Operative Hernia  [facility]    DIET    No restrictions.      Suggest plenty of liquids and high fiber foods to keep stools soft.      May take 1 oz. (2 tablespoons) Milk of Magnesia the evening following surgery to encourage bowel movement.    BANDAGE    Take the bandage off 48 hours after surgery.      If you have tape strips leave them on.  If they become loose, take them off.      Apply ice to groin periodically during the first 48 hours after surgery.    SHOWER    You may shower after the bandage is off.  Pat the incision dry.    ACTIVITY    You may do what is comfortable.    It is not wise to lift weights, or do anything that requires maximal physical effort for several weeks.      Individual restrictions should be discussed with your surgeon.    You may drive when you are comfortable enough to drive safely.  (Usually 3-4 days.)    WORK      You may return to non-physical work whenever you are comfortable.  (If you can drive, you probably can work.)  Physical work will be decided individually.    PAIN    You may have post-operative pain for several days.    Use the pain medication as directed at your discretion.    Expect gradual resolution of pain over several days.    If your hernia was repaired by laparoscopy, you may develop shoulder pain.  This  shoulder pain may be present immediately or may not occur for 24 hours. Some shoulder pain may persist though it should begin declining around the 48 hour jose. Tips for coping include: applying a heating pad to the affected shoulder, lying flat or on your side, use of post-operative analgesia, and ambulating.  Contact your surgeon if the pain becomes intolerable or persistent beyond a few days.    EXPECTATIONS    You can expect: some swelling; black and blue areas possibly involving the testicles and penis; some numbness.  These are not dangerous.    RETURN APPOINTMENT    Please make your post-operative appointment for 10-15 days after surgery.      We recommend making this appointment soon after your surgery date has been confirmed.    CALL OUR CLINICAL OFFICE AT [phone] IF YOU HAVE:    Fever or chills    Drainage from the incision(s)    Significant bleeding    Increasing pain after the first 36 hours    Any other concerns

## 2017-07-22 NOTE — OP NOTE
Surgeon / Clinician: Newton Mao MD    DATE OF SURGERY:  07/18/2017    PREOPERATIVE DIAGNOSIS:  Right adductor tendinopathy.  This is a combined case with Dr. Levin, he will dictate his portion.    POSTOPERATIVE DIAGNOSIS:  Right adductor tendinopathy.  This is a combined case with Dr. Levin, he will dictate his portion.    PROCEDURE:  Right adductor lengthening.    SURGEON:  Newton Mao MD     FIRST ASSISTANT:  Ally Mahan PA-C    ANESTHESIA:  General with local.    ESTIMATED BLOOD LOSS:  5 mL.    OPERATIVE DETAILS:  After identification of patient, correct extremity, review of informed consent, patient was brought to the operating room where general anesthesia was induced.  Preoperative antibiotics were given.  Patient was placed in frog leg supine position with care taken to pad all bony prominences.  Right adductor region was then prepped and draped in a usual sterile manner.  After observation of surgical pause, a 2 cm incision in line with the adductor tendon near the myotendinous junction was made.  Dissection was taken down sharply through the subcutaneous tissue, the knee fascia was exposed.  Deep fascia was split transversely to the incision.  The myotendinous junction was then identified.  An Allis clamp was placed in the distal fragment.  The tendinous portion at the myotendinous junction was then incised, leaving the underlying muscular portion intact.  This allowed lengthening of the tendon approximately 1.5 cm.  The distal tendon portion was then stitched into the deep fascia using interrupted 2-0 Vicryl to prevent further distal retraction and lengthening.  The deep fascia was then closed with interrupted 2-0 Vicryl.  Four Monocryl placed subcutaneously incision site.  A running 4-0 Monocryl and cuticular stitch was then placed.  Steri-Strips were placed on the epidermis.  Sterile dressing was then applied.  The patient was then repositioned for Dr. Levin's portion of the procedure.   Patient tolerated my portion of the procedure well.  No known complications.            Newton Mao MD    D:  07/18/2017 09:04 T:  07/22/2017 04:41  Document:  [id number] CW\CK\hv

## 2017-07-24 ENCOUNTER — TELEPHONE (OUTPATIENT)
Dept: SURGERY | Facility: CLINIC | Age: 37
End: 2017-07-24

## 2017-07-24 NOTE — TELEPHONE ENCOUNTER
Attempted to call patient and do post op check with them, left VM with call back ph.# if they wished to call clinic back.    Carmen Lugo RN BSN

## 2017-07-24 NOTE — TELEPHONE ENCOUNTER
"Post Surgical Follow Up Call -     \"Hi, my name is Carmen Lugo, I'm a registered nurse, and I am calling from [facility] to follow up and see how things are going for you after your recent surgery.\"    Tell me how you are doing now that you are home?\" Patient called back and stated that he is doing really well and is not taking pain medication anymore      Discharge Instructions    \"Do you have any questions regarding your discharge instructions?\"  Discuss dressing and incision care    If applicable:  \"Are you currently taking your post op medication?\"  No     If yes, review dosing schedule with patient.  NA    If applicable:  Discuss any pathology results within normal limits.  NA    Check EPIC schedule to see if patient has Post Op visit already scheduled.  Patient is seeing Dr. Mao next week Monday, will follow-up with Dr. Levin for 6 week check.       Call Summary      \"If you have questions, we encourage you contact us through the main clinic number (give number).\"      \"Thank you for your time and take care!\"    "

## 2017-08-10 ENCOUNTER — TELEPHONE (OUTPATIENT)
Dept: SURGERY | Facility: CLINIC | Age: 37
End: 2017-08-10

## 2017-08-10 NOTE — TELEPHONE ENCOUNTER
Pt called [place] if [place]. He underwent a right groin explorations and sports hernia repair by Dr. Levin on 7/18/17 (combo case with Dr. Mao). He states his wounds are healed. He was informed he may swim in a sahu.    Viviane Holt PA-C

## 2017-08-10 NOTE — TELEPHONE ENCOUNTER
Name of caller: Patient    Reason for Call:  Questions    Surgeon:  Dr. Levin     Recent Surgery:  Yes.    If yes, when & what type:  7/18/17 Sports Hernia Repair      Best phone number to reach pt at is: [phone]  Ok to leave a message with medical info? No    Pharmacy preferred (if calling for a refill): N/A

## 2017-09-06 ENCOUNTER — OFFICE VISIT (OUTPATIENT)
Dept: SURGERY | Facility: CLINIC | Age: 37
End: 2017-09-06
Payer: COMMERCIAL

## 2017-09-06 DIAGNOSIS — Z09 SURGERY FOLLOW-UP EXAMINATION: Primary | ICD-10-CM

## 2017-09-06 PROCEDURE — 99024 POSTOP FOLLOW-UP VISIT: CPT | Performed by: SURGERY

## 2017-09-06 NOTE — LETTER
2017      RE:  Ranjan JOANNA [facility]-:  78    This patient presents in follow-up after recent right-sided sports hernia repair as well as tendon lengthening surgery in his medial thigh.  He has been performing physical therapy and rehabilitation exercises.  He has had persistent discomfort in the right mid abdomen.  The surgical discomfort seems to be resolving.  He however remains limited by the level of discomfort in the right mid abdomen.  This has limited his ability to do physical therapy.  He states that this is the area that had been previously problematic for him.     In the office he was examined.  His surgical sites are healing well.  There is no evidence of infection or hernia recurrence.     We had a lengthy discussion.  In review of both my notes as well as the notes from my partner who initially saw him we both felt his pain was more in the inguinal area.  He however at this time states differently.  Certainly I do not believe that a sports hernia repair would've been the indicated for procedure for the discomfort that he currently presents with.  We discussed this at great length.  At present I have no further recommendations for him other than to continue with core strengthening flexibility treatment.  I've asked that he follow up again in 6-8 weeks to reevaluate.    Marco Levin MD

## 2017-09-07 NOTE — PROGRESS NOTES
This patient presents in follow-up after recent right-sided sports hernia repair as well as tendon lengthening surgery in his medial thigh.  He has been performing physical therapy and rehabilitation exercises.  He has had persistent discomfort in the right mid abdomen.  The surgical discomfort seems to be resolving.  He however remains limited by the level of discomfort in the right mid abdomen.  This has limited his ability to do physical therapy.  He states that this is the area that had been previously problematic for him.    In the office he was examined.  His surgical sites are healing well.  There is no evidence of infection or hernia recurrence.    We had a lengthy discussion.  In review of both my notes as well as the notes from my partner who initially saw him we both felt his pain was more in the inguinal area.  He however at this time states differently.  Certainly I do not believe that a sports hernia repair would've been the indicated for procedure for the discomfort that he currently presents with.  We discussed this at great length.  At present I have no further recommendations for him other than to continue with core strengthening flexibility treatment.  I've asked that he follow up again in 6-8 weeks to reevaluate.

## 2019-08-21 NOTE — MR AVS SNAPSHOT
"              After Visit Summary   2017    Ranjan Breaux    MRN: [id number]           Patient Information     Date Of Birth          1980        Visit Information        Provider Department      2017 1:00 PM Marco Levin MD [facility]      Today's Diagnoses     Surgery follow-up examination    -  1       Follow-ups after your visit        Who to contact     If you have questions or need follow up information about today's clinic visit or your schedule please contact [facility] directly at [phone].  Normal or non-critical lab and imaging results will be communicated to you by [facility], letter or phone within 4 business days after the clinic has received the results. If you do not hear from us within 7 days, please contact the clinic through [employer] or phone. If you have a critical or abnormal lab result, we will notify you by phone as soon as possible.  Submit refill requests through [facility] or call your pharmacy and they will forward the refill request to us. Please allow 3 business days for your refill to be completed.          Additional Information About Your Visit        MyChart Information     [facility] lets you send messages to your doctor, view your test results, renew your prescriptions, schedule appointments and more. To sign up, go to [email or web address] . Click on \"Log in\" on the left side of the screen, which will take you to the Welcome page. Then click on \"Sign up Now\" on the right side of the page.     You will be asked to enter the access code listed below, as well as some personal information. Please follow the directions to create your username and password.     Your access code is: [email or web address]  Expires: 10/15/2017 12:29 PM     Your access code will  in 90 days. If you need help or a new code, please call your [place] clinic or [phone].        Care EveryWhere ID     This is your " Hpi Title: Evaluation of Skin Lesions Care EveryWhere ID. This could be used by other organizations to access your [place] medical records  [id number]         Blood Pressure from Last 3 Encounters:   07/18/17 110/69   07/17/17 106/70   04/13/17 110/64    Weight from Last 3 Encounters:   07/18/17 163 lb 8 oz (74.2 kg)   07/17/17 160 lb (72.6 kg)   04/13/17 170 lb (77.1 kg)              Today, you had the following     No orders found for display       Primary Care Provider    Physician No Ref-Primary       No address on file        Equal Access to Services     [facility]: Hadii kristie no Socornelio, waaxda luqadaha, qaybta kaalmada elsa, brian galvez . So [facility] [phone].    ATENCIÓN: Si habla español, tiene a gonzalez disposición servicios gratuitos de asistencia lingüística. Llame al [phone].    We comply with applicable federal civil rights laws and [place] laws. We do not discriminate on the basis of race, color, national origin, age, disability sex, sexual orientation or gender identity.            Thank you!     Thank you for choosing [facility]  for your care. Our goal is always to provide you with excellent care. Hearing back from our patients is one way we can continue to improve our services. Please take a few minutes to complete the written survey that you may receive in the mail after your visit with us. Thank you!             Your Updated Medication List - Protect others around you: Learn how to safely use, store and throw away your medicines at www.disposemymeds.org.          This list is accurate as of: 9/6/17  1:25 PM.  Always use your most recent med list.                   Brand Name Dispense Instructions for use Diagnosis    fluticasone 50 MCG/ACT spray    FLONASE     Spray 1 spray into both nostrils daily        magnesium hydroxide 400 MG/5ML suspension    MILK OF MAGNESIA    118 mL    Take 30 mLs by mouth daily as needed for constipation    S/P hernia repair          How Severe Are Your Spot(S)?: moderate Have Your Spot(S) Been Treated In The Past?: has not been treated

## 2021-03-16 ENCOUNTER — AMBULATORY - HEALTHEAST (OUTPATIENT)
Dept: NURSING | Facility: CLINIC | Age: 41
End: 2021-03-16

## 2021-04-06 ENCOUNTER — AMBULATORY - HEALTHEAST (OUTPATIENT)
Dept: NURSING | Facility: CLINIC | Age: 41
End: 2021-04-06

## (undated) DEVICE — DRAPE LAP W/ARMBOARD 29410

## (undated) DEVICE — DRAIN PENROSE 0.25"X18" LATEX FREE GR201

## (undated) DEVICE — PACK MINOR SBA15MIFSE

## (undated) DEVICE — DRAPE IOBAN INCISE 23X17" 6650EZ

## (undated) DEVICE — SYR 10ML SLIP TIP W/O NDL

## (undated) DEVICE — SU VICRYL 2-0 TIE 12X18" J905T

## (undated) DEVICE — SOL NACL 0.9% IRRIG 1000ML BOTTLE 07138-09

## (undated) DEVICE — DRSG TEGADERM 2 1/2X 2 3/4"

## (undated) DEVICE — SYR EAR BULB 3OZ 0035830

## (undated) DEVICE — BLADE KNIFE SURG 15 371115

## (undated) DEVICE — DRSG XEROFORM 1X8"

## (undated) DEVICE — SU VICRYL 3-0 SH 27" J316H

## (undated) DEVICE — GLOVE PROTEXIS POWDER FREE 8.5 ORTHOPEDIC 2D73ET85

## (undated) DEVICE — BLADE CLIPPER 4406

## (undated) DEVICE — GLOVE PROTEXIS BLUE W/NEU-THERA 8.5  2D73EB85

## (undated) DEVICE — SU VICRYL 2-0 CP-2 27" UND J869H

## (undated) DEVICE — ESU ELEC BLADE 2.75" COATED/INSULATED E1455

## (undated) DEVICE — NDL 19GA 1.5"

## (undated) DEVICE — SU MONOCRYL 4-0 PS-2 18" UND Y496G

## (undated) DEVICE — DRSG XEROFORM 3X4" 8884432000

## (undated) DEVICE — DRSG TEGADERM 4X4 3/4" 1626

## (undated) DEVICE — GOWN IMPERVIOUS SPECIALTY XL/XLONG 39049

## (undated) DEVICE — LINEN TOWEL PACK X5 5464

## (undated) DEVICE — DRSG GAUZE 4X4" 3033

## (undated) DEVICE — PREP CHLORAPREP 26ML TINTED ORANGE  260815

## (undated) DEVICE — GLOVE PROTEXIS W/NEU-THERA 8.0  2D73TE80

## (undated) DEVICE — NDL 22GA 1.5"

## (undated) DEVICE — SUCTION TIP YANKAUER W/O VENT K86

## (undated) DEVICE — SU VICRYL 0 CT-1 CR 8X27" UND JJ41G

## (undated) DEVICE — DRSG STERI STRIP 1/2X4" R1547

## (undated) RX ORDER — LIDOCAINE HYDROCHLORIDE 20 MG/ML
INJECTION, SOLUTION EPIDURAL; INFILTRATION; INTRACAUDAL; PERINEURAL
Status: DISPENSED
Start: 2017-07-18

## (undated) RX ORDER — LIDOCAINE HYDROCHLORIDE 10 MG/ML
INJECTION, SOLUTION EPIDURAL; INFILTRATION; INTRACAUDAL; PERINEURAL
Status: DISPENSED
Start: 2017-07-18

## (undated) RX ORDER — FENTANYL CITRATE 50 UG/ML
INJECTION, SOLUTION INTRAMUSCULAR; INTRAVENOUS
Status: DISPENSED
Start: 2017-07-18

## (undated) RX ORDER — VECURONIUM BROMIDE 1 MG/ML
INJECTION, POWDER, LYOPHILIZED, FOR SOLUTION INTRAVENOUS
Status: DISPENSED
Start: 2017-07-18

## (undated) RX ORDER — DEXAMETHASONE SODIUM PHOSPHATE 4 MG/ML
INJECTION, SOLUTION INTRA-ARTICULAR; INTRALESIONAL; INTRAMUSCULAR; INTRAVENOUS; SOFT TISSUE
Status: DISPENSED
Start: 2017-07-18

## (undated) RX ORDER — ONDANSETRON 2 MG/ML
INJECTION INTRAMUSCULAR; INTRAVENOUS
Status: DISPENSED
Start: 2017-07-18

## (undated) RX ORDER — PROPOFOL 10 MG/ML
INJECTION, EMULSION INTRAVENOUS
Status: DISPENSED
Start: 2017-07-18

## (undated) RX ORDER — GLYCOPYRROLATE 0.2 MG/ML
INJECTION, SOLUTION INTRAMUSCULAR; INTRAVENOUS
Status: DISPENSED
Start: 2017-07-18

## (undated) RX ORDER — HYDROCODONE BITARTRATE AND ACETAMINOPHEN 5; 325 MG/1; MG/1
TABLET ORAL
Status: DISPENSED
Start: 2017-07-18

## (undated) RX ORDER — BUPIVACAINE HYDROCHLORIDE AND EPINEPHRINE 5; 5 MG/ML; UG/ML
INJECTION, SOLUTION EPIDURAL; INTRACAUDAL; PERINEURAL
Status: DISPENSED
Start: 2017-07-18

## (undated) RX ORDER — CEFAZOLIN SODIUM 2 G/100ML
INJECTION, SOLUTION INTRAVENOUS
Status: DISPENSED
Start: 2017-07-18